# Patient Record
Sex: MALE | Race: AMERICAN INDIAN OR ALASKA NATIVE
[De-identification: names, ages, dates, MRNs, and addresses within clinical notes are randomized per-mention and may not be internally consistent; named-entity substitution may affect disease eponyms.]

---

## 2018-01-16 ENCOUNTER — HOSPITAL ENCOUNTER (EMERGENCY)
Dept: HOSPITAL 5 - ED | Age: 38
LOS: 1 days | Discharge: HOME | End: 2018-01-17
Payer: COMMERCIAL

## 2018-01-16 DIAGNOSIS — R50.81: ICD-10-CM

## 2018-01-16 DIAGNOSIS — J11.1: Primary | ICD-10-CM

## 2018-01-16 DIAGNOSIS — F17.200: ICD-10-CM

## 2018-01-16 PROCEDURE — 87400 INFLUENZA A/B EACH AG IA: CPT

## 2018-01-16 PROCEDURE — 81001 URINALYSIS AUTO W/SCOPE: CPT

## 2018-01-16 PROCEDURE — 99283 EMERGENCY DEPT VISIT LOW MDM: CPT

## 2018-01-17 VITALS — DIASTOLIC BLOOD PRESSURE: 70 MMHG | SYSTOLIC BLOOD PRESSURE: 147 MMHG

## 2018-01-17 LAB
BILIRUB UR QL STRIP: (no result)
BLOOD UR QL VISUAL: (no result)
MUCOUS THREADS #/AREA URNS HPF: (no result) /HPF
NITRITE UR QL STRIP: (no result)
PH UR STRIP: 5 [PH] (ref 5–7)
PROT UR STRIP-MCNC: (no result) MG/DL
RBC #/AREA URNS HPF: 1 /HPF (ref 0–6)
UROBILINOGEN UR-MCNC: < 2 MG/DL (ref ?–2)
WBC #/AREA URNS HPF: 1 /HPF (ref 0–6)

## 2018-01-17 NOTE — EMERGENCY DEPARTMENT REPORT
ED Fever HPI





- General


Chief Complaint: Fever


Stated Complaint: COLD SX


Source: patient, family


Exam Limitations: no limitations





- History of Present Illness


Initial Comments: 





37 yo male who comes in today due to fever, chills, and body aches.  He states 

that these complaints started on Monday of this week.  He works in a cold 

climate/refrigerated area.  Also admits that other co-workers are having the 

same complaints.  Temperature 101.6 in the ED on today.  Denies any past 

medical history or allergies to medications. 


Timing/Duration: other (Monday)


Fever Severity/Quality: other (101.6.   )


Fever Therapy PTA: Tylenol


Associated Symptoms: muscle aches, other (back pain )





ED Review of Systems


ROS: 


Stated complaint: COLD SX


Other details as noted in HPI





Constitutional: fever, malaise


Eyes: denies: eye pain, eye discharge, vision change


ENT: denies: ear pain, throat pain


Respiratory: denies: cough, shortness of breath, wheezing


Cardiovascular: denies: chest pain, palpitations


Endocrine: no symptoms reported


Gastrointestinal: denies: abdominal pain, nausea, diarrhea


Genitourinary: denies: urgency, dysuria


Musculoskeletal: back pain, other (body aches )


Skin: denies: rash, lesions


Neurological: denies: headache, weakness, paresthesias


Psychiatric: denies: anxiety, depression


Hematological/Lymphatic: denies: easy bleeding, easy bruising





ED Past Medical Hx





- Past Medical History


Previous Medical History?: No





- Surgical History


Past Surgical History?: No





- Social History


Smoking Status: Current Some Day Smoker


Substance Use Type: Alcohol





- Medications


Home Medications: 


 Home Medications











 Medication  Instructions  Recorded  Confirmed  Last Taken  Type


 


Oseltamivir [Tamiflu] 75 mg PO BID #10 cap 01/17/18  Unknown Rx














ED Physical Exam





- General


Limitations: No Limitations


General appearance: alert, in no apparent distress





- Head


Head exam: Present: atraumatic, normocephalic





- Eye


Eye exam: Present: normal appearance





- ENT


ENT exam: Present: mucous membranes moist





- Neck


Neck exam: Present: normal inspection





- Respiratory


Respiratory exam: Present: normal lung sounds bilaterally.  Absent: respiratory 

distress





- Cardiovascular


Cardiovascular Exam: Present: regular rate, normal rhythm.  Absent: systolic 

murmur, diastolic murmur, rubs, gallop





- GI/Abdominal


GI/Abdominal exam: Present: soft, normal bowel sounds





- Extremities Exam


Extremities exam: Present: normal inspection





- Back Exam


Back exam: Present: tenderness (bilateral lumbosacral tenderness )





- Neurological Exam


Neurological exam: Present: alert, oriented X3





- Psychiatric


Psychiatric exam: Present: normal affect, normal mood





- Skin


Skin exam: Present: warm, dry, intact, normal color.  Absent: rash





ED Course


 Vital Signs











  01/16/18 01/17/18





  19:29 16:24


 


Temperature 101.6 F H 98.3 F


 


Pulse Rate 75 69


 


Respiratory 18 18





Rate  


 


Blood Pressure 119/69 


 


Blood Pressure  147/70





[Right]  


 


O2 Sat by Pulse 100 100





Oximetry  














- Reevaluation(s)


Reevaluation #1: 





01/17/18 16:43


Temperature on re-eval 98.3 after tylenol.  





ED Medical Decision Making





- Medical Decision Making





Influenza swab negative.  


U/A revealed trace leukocyte esterase.  


Influenza 


Fever 


Viral syndrome 





- Differential Diagnosis


Influenza, fever, viral syndrome 


Critical care attestation.: 


If time is entered above; I have spent that time in minutes in the direct care 

of this critically ill patient, excluding procedure time.








ED Disposition


Clinical Impression: 


 Influenza, Fever





Disposition: DC-01 TO HOME OR SELFCARE


Is pt being admited?: No


Does the pt Need Aspirin: No


Condition: Undetermined


Instructions:  Influenza (ED), Fever in Adults (ED)


Additional Instructions: 


Take medicine as prescribed.  Please also see the fever instructions.  May take 

tylenol 650 mg by mouth every 6-8 hours as needed for temperature greater than 

100.4.  Remember to hydrate also until better.  


Prescriptions: 


Oseltamivir [Tamiflu] 75 mg PO BID #10 cap


Referrals: 


GERMAINE CASAS MD [Primary Care Provider] - 3-5 Days


Time of Disposition: 17:07

## 2020-06-06 ENCOUNTER — HOSPITAL ENCOUNTER (EMERGENCY)
Dept: HOSPITAL 5 - ED | Age: 40
Discharge: LEFT BEFORE DISCHARGE | End: 2020-06-06
Payer: SELF-PAY

## 2020-06-06 VITALS — DIASTOLIC BLOOD PRESSURE: 75 MMHG | SYSTOLIC BLOOD PRESSURE: 109 MMHG

## 2020-06-06 DIAGNOSIS — Z53.21: ICD-10-CM

## 2020-06-06 DIAGNOSIS — R07.0: Primary | ICD-10-CM

## 2020-06-06 NOTE — EMERGENCY DEPARTMENT REPORT
Chief Complaint: Sore Throat


Stated Complaint: THROAT PAIN





- HPI


History of Present Illness: 





40-year-old -American male presents to the emergency room complaining of 

a sore throat this is been intermittent x1 month.  Patient is taking nothing for

pain just been using throat lozenges.  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 

Patient denies any fever chills no nausea no vomiting.  Patient denies any runny

nose nasal congestion or headache.





- Exam


Vital Signs: 


                                   Vital Signs











  06/06/20





  16:16


 


Temperature 98.3 F


 


Pulse Rate 61


 


Respiratory 20





Rate 


 


Blood Pressure 109/75


 


O2 Sat by Pulse 96





Oximetry 











Physical Exam: 





Gen: alert oriented NAD





HEENT: Oral mucosa moist patent tonsils are non-hypertrophic not erythematous 

there is no lymphadenopathy appreciated





Cardic: regular rate and rhythm no murmurs appreciated





Resp: Clear to auscultation bilateral no wheezing no rales or rhonchi.











MSE screening note: 


Focused history and physical exam performed.


Due to findings the following was ordered:





40-year-old -American male presents to the emergency room complaining of 

a sore throat this is been intermittent x1 month.  Patient is taking nothing for

pain just been using throat lozenges.  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 

Patient denies any fever chills no nausea no vomiting.  Patient denies any runny

nose nasal congestion or headache.











Recommend taking over-the-counter Tylenol or ibuprofen or naproxen for pain 

management.  Follow-up with your primary care provider.





ED Disposition for MSE


Disposition: Z-07 MED SCREENING EXAM-LEFT


Is pt being admited?: No


Does the pt Need Aspirin: No


Condition: Stable


Additional Instructions: 


Recommend taking over-the-counter Tylenol or ibuprofen or naproxen.  Follow-up 

with the primary care provider.


Referrals: 


FABRICE ROLAND MD [Staff Physician] - 3-5 Days


Forms:  Work/School Release Form(ED)

## 2021-09-17 ENCOUNTER — HOSPITAL ENCOUNTER (EMERGENCY)
Dept: HOSPITAL 5 - ED | Age: 41
Discharge: HOME | End: 2021-09-17
Payer: SELF-PAY

## 2021-09-17 VITALS — DIASTOLIC BLOOD PRESSURE: 86 MMHG | SYSTOLIC BLOOD PRESSURE: 132 MMHG

## 2021-09-17 DIAGNOSIS — R07.89: Primary | ICD-10-CM

## 2021-09-17 DIAGNOSIS — Z98.890: ICD-10-CM

## 2021-09-17 PROCEDURE — 71046 X-RAY EXAM CHEST 2 VIEWS: CPT

## 2021-09-17 PROCEDURE — 93005 ELECTROCARDIOGRAM TRACING: CPT

## 2021-09-17 PROCEDURE — 99283 EMERGENCY DEPT VISIT LOW MDM: CPT

## 2021-09-17 NOTE — EMERGENCY DEPARTMENT REPORT
ED General Adult HPI





- General


Chief complaint: Chest Pain


Stated complaint: CHEST PAIN


Time Seen by Provider: 21 17:07


Source: patient


Mode of arrival: Ambulatory


Limitations: No Limitations





- History of Present Illness


Initial comments: 


41-year-old male who reports no significant past medical history presents to the

ER today with complaints of left-sided chest pain.  Patient points to his left 

upper mid axillary area as to where his pain is located.  He states that the 

pain is mainly when he touches it.  He denies any pain with deep breaths.  He 

denies any shortness of breath, nausea, vomiting, cough, fever or chills.  He 

denies any injury but does admit to strenuous activity at work.  He has not 

tried taking anything for the pain.  He states that he was concerned and wanted 

to come having it checked.  He denies any extremity swelling or calf pain.  He 

states that he vapes off and on no illicit drug use.  He states that his mom has

congestive heart failure with otherwise no known history of MIs, or any other 

family history of heart disease. He denies any risk factors for PE/DVT. 


MD Complaint: left chest pain underneath left arm 


-: Gradual (yesterday)





- Related Data


                                  Previous Rx's











 Medication  Instructions  Recorded  Last Taken  Type


 


Oseltamivir [Tamiflu] 75 mg PO BID #10 cap 18 Unknown Rx


 


Ibuprofen [Motrin] 600 mg PO Q8H PRN #30 tablet 21 Unknown Rx











                                    Allergies











Allergy/AdvReac Type Severity Reaction Status Date / Time


 


No Known Allergies Allergy   Unverified 18 19:29














ED Review of Systems


ROS: 


Stated complaint: CHEST PAIN


Other details as noted in HPI





Comment: All other systems reviewed and negative


Constitutional: denies: chills, fever


Eyes: denies: eye pain, eye discharge, vision change


ENT: denies: ear pain, throat pain


Respiratory: denies: cough, shortness of breath, wheezing


Cardiovascular: chest pain.  denies: palpitations, edema, syncope, paroxysmal 

nocturnal dyspnea


Gastrointestinal: denies: abdominal pain, nausea, vomiting, diarrhea, constip

ation, hematemesis, hematochezia


Genitourinary: denies: urgency, dysuria, frequency, hematuria, discharge, 

testicular pain, testicular mass


Musculoskeletal: denies: back pain, joint swelling, arthralgia


Skin: denies: rash, lesions, change in color, change in hair/nails, pruritus


Neurological: denies: headache, weakness, numbness, paresthesias, confusion, 

abnormal gait, vertigo


Psychiatric: denies: anxiety, depression, auditory hallucinations, visual 

hallucinations, homicidal thoughts, suicidal thoughts


Hematological/Lymphatic: denies: easy bleeding, easy bruising





ED Past Medical Hx





- Surgical History


Additional Surgical History: Umbilical hernia repair





- Social History


Smoking Status: Never Smoker


Substance Use Type: Alcohol





- Medications


Home Medications: 


                                Home Medications











 Medication  Instructions  Recorded  Confirmed  Last Taken  Type


 


Oseltamivir [Tamiflu] 75 mg PO BID #10 cap 18  Unknown Rx


 


Ibuprofen [Motrin] 600 mg PO Q8H PRN #30 tablet 21  Unknown Rx














ED Physical Exam





- General


Limitations: No Limitations


General appearance: alert, in no apparent distress





- Head


Head exam: Present: atraumatic, normocephalic, normal inspection





- Eye


Eye exam: Present: normal appearance, PERRL, EOMI


Pupils: Present: normal accommodation





- Neck


Neck exam: Present: normal inspection, full ROM





- Respiratory


Respiratory exam: Present: normal lung sounds bilaterally, chest wall tenderness

 (mild point tenderness left upper mid axillary area without any swelling, 

erythema, deformity or bruising ).  Absent: respiratory distress, wheezes, 

rales, rhonchi, stridor





- Cardiovascular


Cardiovascular Exam: Present: regular rate, normal rhythm, normal heart sounds





- GI/Abdominal


GI/Abdominal exam: Present: soft.  Absent: distended, tenderness, normal bowel 

sounds





- Extremities Exam


Extremities exam: Present: normal inspection.  Absent: pedal edema, calf 

tenderness





- Neurological Exam


Neurological exam: Present: alert, oriented X3, CN II-XII intact, normal gait





- Psychiatric


Psychiatric exam: Present: normal affect, normal mood





- Skin


Skin exam: Present: intact





ED Course


                                   Vital Signs











  21





  17:01


 


Pulse Rate 77


 


Respiratory 18





Rate 


 


Blood Pressure 132/86


 


O2 Sat by Pulse 99





Oximetry 














ED Medical Decision Making





- Radiology Data


Radiology results: report reviewed


Patient: MANSI NOLAN                                                   

             MR#: [id number]  


[id number]          


: 1980                                                                

Acct:[id number]      


 


Age/Sex: 41 / M                                                                

ADM Date: 21     


 


Loc: ED       


Attending Dr:   


 


 


Ordering Physician: GWEN RIZZO  


Date of Service: 21  


Procedure(s): XR chest routine 2V  


Accession Number(s): [id number]  


 


cc: GWEN RIZZO   


 


Fluoro Time In Minutes:   


 


CHEST 2 VIEWS   


 


 INDICATION / CLINICAL INFORMATION:  


 left chest pain.  


 


 COMPARISON:   


 None available.  


 


 FINDINGS:  


 


 SUPPORT DEVICES: None.  


 


 HEART / MEDIASTINUM: No significant abnormality.   


 


 LUNGS / PLEURA: No significant pulmonary or pleural abnormality. No 

pneumothorax.   


 


 ADDITIONAL FINDINGS: No significant additional findings.  


 


 IMPRESSION:  


 1. No acute findings.  


 


 Signer Name: Garo Rivera MD   


 Signed: 2021 5:33 PM  


 Workstation Name: [id number]   


 


 


Transcribed By: TONIO  


Dictated By: Garo Rivera MD  


Electronically Authenticated By: Garo Rivera MD    


Signed Date/Time: 21                                


 


 


 


DD/DT: 21                                              








- Medical Decision Making


Chest x-ray shows nothing acute.  EKG shows normal sinus rhythm with some LVH 

changes but otherwise normal standing, no acute ischemic changes for the 

significant dysrhythmias.  On exam patient has point tenderness to palpation to 

his left lateral chest wall at the upper mid axillary area which reproduces his 

pain.  Patient is not toxic  nor is he ill appearing, is not in any acute d

istress. He is neurologically intact with normal gait. His vital signs are 

stable.  Suspect patient pain is musculoskeletal at this time. He vapes but 

otherwise no other risk factors for CAD, or PE.  I do not suspect STEMI, acute 

coronary syndrome, PE, dissection or any other significant emergent conditions 

warranting additional testing at this time. discussed suspected dx and treatment

 plan with patient.  Patient expressed understanding of all instructions and 

agree with plan.  Patient was stable at time of discharge.


Critical care attestation.: 


If time is entered above; I have spent that time in minutes in the direct care 

of this critically ill patient, excluding procedure time.








ED Disposition


Clinical Impression: 


 Chest wall pain





Disposition:  HOME / SELF CARE / HOMELESS


Is pt being admited?: No


Does the pt Need Aspirin: No


Condition: Stable


Instructions:  Chest Wall Pain, Easy-to-Read


Additional Instructions: 


I recommend that you take motrin as prescribed. Follow up with your PCP next 

week. Return to ED if worse. 


Prescriptions: 


Ibuprofen [Motrin] 600 mg PO Q8H PRN #30 tablet


 PRN Reason: Pain


Referrals: 


FABRICE ROLAND MD [Staff Physician] - 3-5 Days


[facility] [Provider Group] - 3-5 Days


Forms:  Work/School Release Form(ED)


Time of Disposition: 17:53


Print Language: ENGLISH

## 2021-09-17 NOTE — XRAY REPORT
CHEST 2 VIEWS 



INDICATION / CLINICAL INFORMATION:

left chest pain.



COMPARISON: 

None available.



FINDINGS:



SUPPORT DEVICES: None.



HEART / MEDIASTINUM: No significant abnormality. 



LUNGS / PLEURA: No significant pulmonary or pleural abnormality. No pneumothorax. 



ADDITIONAL FINDINGS: No significant additional findings.



IMPRESSION:

1. No acute findings.



Signer Name: Garo Rivera MD 

Signed: 9/17/2021 5:33 PM

Workstation Name: [id number]

## 2021-09-18 NOTE — ELECTROCARDIOGRAPH REPORT
[facility]

                                       

Test Date:    2021               Test Time:    16:54:12

Pat Name:     MANSI NOLAN             Department:   

Patient ID:   [id number]          Room:          

Gender:       M                        Technician:   

:          1980               Requested By: KOFI LOYA

Order Number: [id number]              Reading MD:   Humberto Rashid

                                 Measurements

Intervals                              Axis          

Rate:         67                       P:            43

MD:           140                      QRS:          73

QRSD:         93                       T:            22

QT:           395                                    

QTc:          418                                    

                           Interpretive Statements

Sinus rhythm

Consider left ventricular hypertrophy

No previous ECG available for comparison

Electronically Signed On 2021 14:50:12 EDT by Humberto Rashid